# Patient Record
Sex: MALE | Race: WHITE | Employment: OTHER | URBAN - METROPOLITAN AREA
[De-identification: names, ages, dates, MRNs, and addresses within clinical notes are randomized per-mention and may not be internally consistent; named-entity substitution may affect disease eponyms.]

---

## 2017-01-17 PROCEDURE — 85025 COMPLETE CBC W/AUTO DIFF WBC: CPT | Performed by: FAMILY MEDICINE

## 2017-01-17 PROCEDURE — 84520 ASSAY OF UREA NITROGEN: CPT | Performed by: INTERNAL MEDICINE

## 2017-01-17 PROCEDURE — 82565 ASSAY OF CREATININE: CPT | Performed by: INTERNAL MEDICINE

## 2017-01-17 PROCEDURE — 36415 COLL VENOUS BLD VENIPUNCTURE: CPT | Performed by: FAMILY MEDICINE

## 2017-07-19 PROBLEM — E78.5 HYPERLIPIDEMIA, UNSPECIFIED HYPERLIPIDEMIA TYPE: Status: ACTIVE | Noted: 2017-07-19

## 2017-07-19 PROBLEM — R73.01 IMPAIRED FASTING GLUCOSE: Status: ACTIVE | Noted: 2017-07-19

## 2017-07-19 PROBLEM — R97.20 ELEVATED PSA: Status: ACTIVE | Noted: 2017-07-19

## 2018-02-23 PROBLEM — I60.9 SAH (SUBARACHNOID HEMORRHAGE) (HCC): Status: ACTIVE | Noted: 2018-02-23

## 2018-02-23 RX ORDER — SODIUM CHLORIDE, SODIUM LACTATE, POTASSIUM CHLORIDE, CALCIUM CHLORIDE 600; 310; 30; 20 MG/100ML; MG/100ML; MG/100ML; MG/100ML
INJECTION, SOLUTION INTRAVENOUS CONTINUOUS
Status: CANCELLED | OUTPATIENT
Start: 2018-02-23

## 2018-03-26 ENCOUNTER — ANESTHESIA (OUTPATIENT)
Dept: ENDOSCOPY | Facility: HOSPITAL | Age: 73
End: 2018-03-26
Payer: MEDICARE

## 2018-03-26 ENCOUNTER — HOSPITAL ENCOUNTER (OUTPATIENT)
Facility: HOSPITAL | Age: 73
Setting detail: HOSPITAL OUTPATIENT SURGERY
Discharge: HOME | End: 2018-03-26
Attending: INTERNAL MEDICINE | Admitting: INTERNAL MEDICINE
Payer: MEDICARE

## 2018-03-26 ENCOUNTER — SURGERY (OUTPATIENT)
Age: 73
End: 2018-03-26

## 2018-03-26 ENCOUNTER — ANESTHESIA EVENT (OUTPATIENT)
Dept: ENDOSCOPY | Facility: HOSPITAL | Age: 73
End: 2018-03-26
Payer: MEDICARE

## 2018-03-26 VITALS
OXYGEN SATURATION: 96 % | BODY MASS INDEX: 46.65 KG/M2 | RESPIRATION RATE: 18 BRPM | SYSTOLIC BLOOD PRESSURE: 139 MMHG | HEIGHT: 69 IN | WEIGHT: 315 LBS | TEMPERATURE: 99 F | DIASTOLIC BLOOD PRESSURE: 75 MMHG | HEART RATE: 55 BPM

## 2018-03-26 DIAGNOSIS — K22.70 BARRETT'S ESOPHAGUS WITHOUT DYSPLASIA: ICD-10-CM

## 2018-03-26 DIAGNOSIS — Z86.010 HISTORY OF COLON POLYPS: ICD-10-CM

## 2018-03-26 PROCEDURE — 0DB38ZX EXCISION OF LOWER ESOPHAGUS, VIA NATURAL OR ARTIFICIAL OPENING ENDOSCOPIC, DIAGNOSTIC: ICD-10-PCS | Performed by: INTERNAL MEDICINE

## 2018-03-26 PROCEDURE — 0DBK8ZX EXCISION OF ASCENDING COLON, VIA NATURAL OR ARTIFICIAL OPENING ENDOSCOPIC, DIAGNOSTIC: ICD-10-PCS | Performed by: INTERNAL MEDICINE

## 2018-03-26 PROCEDURE — 88321 CONSLTJ&REPRT SLD PREP ELSWR: CPT | Performed by: INTERNAL MEDICINE

## 2018-03-26 PROCEDURE — 88305 TISSUE EXAM BY PATHOLOGIST: CPT | Performed by: INTERNAL MEDICINE

## 2018-03-26 PROCEDURE — 0DBH8ZX EXCISION OF CECUM, VIA NATURAL OR ARTIFICIAL OPENING ENDOSCOPIC, DIAGNOSTIC: ICD-10-PCS | Performed by: INTERNAL MEDICINE

## 2018-03-26 RX ORDER — SODIUM CHLORIDE, SODIUM LACTATE, POTASSIUM CHLORIDE, CALCIUM CHLORIDE 600; 310; 30; 20 MG/100ML; MG/100ML; MG/100ML; MG/100ML
INJECTION, SOLUTION INTRAVENOUS CONTINUOUS
Status: DISCONTINUED | OUTPATIENT
Start: 2018-03-26 | End: 2018-03-26

## 2018-03-26 NOTE — OPERATIVE REPORT
PATIENT NAME: Yulisa Chavez  MRN: [id number]  DATE OF OPERATION: 3/26/2018  PREOPERATIVE DIAGNOSIS:   1. Personal history of Lujan's esophagus without dysplasia  2. Personal history of colon polyps  POSTOPERATIVE DIAGNOSES:  1.  Irregular squamocolumnar junct Biopsies were taken. There were flat and biopsies were taken from that area. There was a small sliding hiatal hernia seen. 2. Normal stomach throughout with no evidence of ulcers, masses or other abnormalities.  Retroflexion revealed a normal cardia an showed no dysplasia then repeat upper endoscopy in 2-3 years.     MD [facility]

## 2018-03-26 NOTE — H&P
[facility] Department of  Gastroenterology  Update Health History :       Rehana Ruiz  male   Dank Ruelas MD     [id number]  5/5/1945 Primary Care Physician  Divine Alston MD        HPI :  History of Lujan's esophagus, and serrated colon po with deformed duodenal bulb, narrowed               duodenum obstructed by food.   Gastric bx + for                H. pylori, duodenal bx negative  1/16/13: UPPER GI ENDOSCOPY,BIOPSY      Comment: deformed duodenum with a choledocho-duodenal (TRILYTE) 420 g Oral Recon Soln Take as directed, split dose Disp: 1 Bottle Rfl: 0   Coenzyme Q10 (COQ10 OR) Take by mouth. Disp:  Rfl:        Review of Symptoms:  A comprehensive 10 point review of systems was completed.     /70 (BP Location: Left ar

## 2018-03-26 NOTE — ANESTHESIA POSTPROCEDURE EVALUATION
[address] Patient Status:  Hospital Outpatient Surgery   Age/Gender 67year old male MRN [id number]   Location [facility]. Attending Ra Black MD   Hosp Day # 0 PCP Elis Cortes MD       Anesthesia Post-op Note

## 2018-03-26 NOTE — ANESTHESIA PREPROCEDURE EVALUATION
PRE-OP EVALUATION    Patient Name: Tatiana Winters    Pre-op Diagnosis: Lujan's esophagus without dysplasia [K22.70]  History of colon polyps [Z86.010]    Procedure(s):  COLONOSCOPY/ESOPHAGOGASTRODUODENOSCOPY       Surgeon(s) and Role:     Sigrid Galindo, ascending colon lipoma, 5 mm splenic flexure                serrated adenoma polyp, diverticulosis  2/27/13: ENDOSCOPIC ULTRASOUND EXAM      Comment: Dr. Adonay Joyner, EGD/EUS, iiregular GE junction                (bx showed intestinal metaplasia), hiatal II  Mouth opening: <3 FB  TM distance: < 4 cm  Neck ROM: limited Cardiovascular    Cardiovascular exam normal.  Rhythm: regular  Rate: normal     Dental    No notable dental history.          Pulmonary    Pulmonary exam normal.  Breath sounds clear to auscu

## 2018-03-29 NOTE — PROGRESS NOTES
3/29/2018  [address] [facility] [address]    Dear Jay Mcgee,       Here are the biopsy/pathology findings from your recent EGD (upper endoscopy) and colonoscopy: The biopsy/pathology findings from your upper endoscopy showed:  1.   Esophagus b

## 2018-04-15 ENCOUNTER — ANESTHESIA EVENT (OUTPATIENT)
Dept: ENDOSCOPY | Facility: HOSPITAL | Age: 73
End: 2018-04-15
Payer: MEDICARE

## 2018-04-15 NOTE — ANESTHESIA PREPROCEDURE EVALUATION
PRE-OP EVALUATION    Patient Name: Emily Rogel    Pre-op Diagnosis: Pancreas cyst [K86.2]    Procedure(s):  ENDOSCOPIC ULTRASOUND WITH FINE NEEDLE ASPIRATION    Surgeon(s) and Role:     Faraz Myrick MD - Primary    Pre-op vitals reviewed.         Body Surgical History:  3/26/2018: COLONOSCOPY N/A      Comment: Procedure: COLONOSCOPY;  Surgeon: Mark Avitia MD;  Location: [facility] ENDOSCOPY  1/16/13: COLONOSCOPY,DIAGNOSTIC      Comment: ascending colon lipoma, 5 mm splenic flexure Date    01/20/2018   K 4.7 01/20/2018    01/20/2018   CO2 24.9 01/20/2018   BUN 27 (H) 03/10/2018   CREATSERUM 1.41 (H) 03/10/2018    (H) 01/20/2018            Airway      Mallampati: II  Mouth opening: >3 FB  TM distance: 4 - 6 cm  Neck

## 2018-04-16 ENCOUNTER — LAB ENCOUNTER (OUTPATIENT)
Dept: LAB | Age: 73
End: 2018-04-16
Attending: INTERNAL MEDICINE
Payer: MEDICARE

## 2018-04-16 ENCOUNTER — ANESTHESIA (OUTPATIENT)
Dept: ENDOSCOPY | Facility: HOSPITAL | Age: 73
End: 2018-04-16
Payer: MEDICARE

## 2018-04-16 ENCOUNTER — SURGERY (OUTPATIENT)
Age: 73
End: 2018-04-16

## 2018-04-16 ENCOUNTER — HOSPITAL ENCOUNTER (OUTPATIENT)
Facility: HOSPITAL | Age: 73
Setting detail: HOSPITAL OUTPATIENT SURGERY
Discharge: HOME | End: 2018-04-16
Attending: INTERNAL MEDICINE | Admitting: INTERNAL MEDICINE
Payer: MEDICARE

## 2018-04-16 VITALS
TEMPERATURE: 98 F | DIASTOLIC BLOOD PRESSURE: 74 MMHG | HEART RATE: 60 BPM | OXYGEN SATURATION: 99 % | BODY MASS INDEX: 45.91 KG/M2 | RESPIRATION RATE: 18 BRPM | WEIGHT: 310 LBS | SYSTOLIC BLOOD PRESSURE: 139 MMHG | HEIGHT: 69 IN

## 2018-04-16 DIAGNOSIS — K86.2 CYST OF PANCREAS: Primary | ICD-10-CM

## 2018-04-16 DIAGNOSIS — K86.2 PANCREAS CYST: ICD-10-CM

## 2018-04-16 PROCEDURE — 88108 CYTOPATH CONCENTRATE TECH: CPT | Performed by: INTERNAL MEDICINE

## 2018-04-16 PROCEDURE — 82150 ASSAY OF AMYLASE: CPT

## 2018-04-16 PROCEDURE — 82378 CARCINOEMBRYONIC ANTIGEN: CPT

## 2018-04-16 PROCEDURE — 0F9G8ZX DRAINAGE OF PANCREAS, VIA NATURAL OR ARTIFICIAL OPENING ENDOSCOPIC, DIAGNOSTIC: ICD-10-PCS | Performed by: INTERNAL MEDICINE

## 2018-04-16 RX ORDER — ONDANSETRON 2 MG/ML
4 INJECTION INTRAMUSCULAR; INTRAVENOUS AS NEEDED
Status: DISCONTINUED | OUTPATIENT
Start: 2018-04-16 | End: 2018-04-16

## 2018-04-16 RX ORDER — SODIUM CHLORIDE, SODIUM LACTATE, POTASSIUM CHLORIDE, CALCIUM CHLORIDE 600; 310; 30; 20 MG/100ML; MG/100ML; MG/100ML; MG/100ML
INJECTION, SOLUTION INTRAVENOUS CONTINUOUS
Status: DISCONTINUED | OUTPATIENT
Start: 2018-04-16 | End: 2018-04-16

## 2018-04-16 RX ORDER — CEFOXITIN 2 G/1
INJECTION, POWDER, FOR SOLUTION INTRAVENOUS
Status: DISCONTINUED | OUTPATIENT
Start: 2018-04-16 | End: 2018-04-16

## 2018-04-16 RX ORDER — NALOXONE HYDROCHLORIDE 0.4 MG/ML
80 INJECTION, SOLUTION INTRAMUSCULAR; INTRAVENOUS; SUBCUTANEOUS AS NEEDED
Status: DISCONTINUED | OUTPATIENT
Start: 2018-04-16 | End: 2018-04-16

## 2018-04-16 NOTE — OPERATIVE REPORT
OPERATIVE REPORT   PATIENT NAME: Holly Gomez  MRN: [id number]  DATE OF OPERATION: 4/16/2018  PREOPERATIVE DIAGNOSIS:   1. Enlarging pancreatic tail cyst  POSTOPERATIVE DIAGNOSES:  1.  Pancreatic tail cyst possible pseudocyst or serocystadenoma  PROCEDURE PERF cystic lesion possible pseudocyst versus serous cystadenoma. RECOMMENDATIONS:  1. Await final cytology, fluid analysis results. Depending on the results to determine subsequent follow-up.   Most likely require follow-up imaging study in 6 month if cytolog

## 2018-04-16 NOTE — ANESTHESIA POSTPROCEDURE EVALUATION
[address] Patient Status:  Hospital Outpatient Surgery   Age/Gender 67year old male MRN [id number]   Location [facility]. Attending Justice Craig MD   Hosp Day # 0 PCP Chely Patton MD       Anesthesia Post-op Note

## 2018-04-18 NOTE — PROGRESS NOTES
Cytology is negative for malignant cells. We will be waiting for the pancreatic cyst fluid analysis.

## 2018-04-25 NOTE — H&P
[facility] Department of  Gastroenterology  Update Health History :       Sobeida Das  male   Gilberto Skiff, MD     [id number]  5/5/1945 Primary Care Physician  Annabel Nice MD        HPI :  Lujan's esophagus, personal history of colon polyps HISTORY      Comment: ear surgery  10/31/12: UPPER GI ENDOSCOPY,BIOPSY      Comment: Ulcerative esophagitis, 1900 cc of fluid in                stomach, antral gastric nodule (bx benign),                gastritis with deformed duodenal bulb, narrowed TAKE 1 TABLET(10 MG) BY MOUTH EVERY NIGHT Disp: 90 tablet Rfl: 3   Pantoprazole Sodium 40 MG Oral Tab EC TAKE 1 TABLET(40 MG) BY MOUTH EVERY MORNING BEFORE BREAKFAST Disp: 90 tablet Rfl: 3   acetaminophen (TYLENOL) 325 MG Oral Tab Take 650 mg by mouth shari

## 2018-05-02 NOTE — PROGRESS NOTES
CEA 0.4, Amylase 75 suggestive of serous cystadenoma. 5/1/2018  [address] [facility] [address]    Dear Jacek Holland,       Here are the biopsy/pathology findings from your recent endoscopic ultrasound  1.   Pancreatic cyst evaluation and fluid

## 2018-05-08 NOTE — PROGRESS NOTES
Letter sent and pt viewed via my chart  /1/2018   Kellen Alex   [phone] [address]     Dear Fer Zee,       Here are the biopsy/pathology findings from your recent endoscopic ultrasound   1.  Pancreatic cyst evaluation and fluid analysis is mos

## 2018-07-18 PROBLEM — D69.2 SENILE PURPURA (HCC): Status: ACTIVE | Noted: 2018-07-18

## 2018-09-15 PROCEDURE — 84153 ASSAY OF PSA TOTAL: CPT | Performed by: UROLOGY

## 2018-09-15 PROCEDURE — 84154 ASSAY OF PSA FREE: CPT | Performed by: UROLOGY

## 2019-01-22 PROBLEM — Z00.00 ANNUAL PHYSICAL EXAM: Status: ACTIVE | Noted: 2019-01-22

## 2019-01-22 PROBLEM — S06.6X0D SUBARACHNOID HEMORRHAGE FOLLOWING INJURY, NO LOSS OF CONSCIOUSNESS, SUBSEQUENT ENCOUNTER: Status: RESOLVED | Noted: 2019-01-22 | Resolved: 2019-01-22

## 2019-01-22 PROBLEM — S06.6X0D SUBARACHNOID HEMORRHAGE FOLLOWING INJURY, NO LOSS OF CONSCIOUSNESS, SUBSEQUENT ENCOUNTER: Status: ACTIVE | Noted: 2019-01-22

## 2019-01-22 PROBLEM — Z00.00 ANNUAL PHYSICAL EXAM: Status: RESOLVED | Noted: 2019-01-22 | Resolved: 2019-01-22

## 2019-01-23 PROBLEM — D69.2 SENILE PURPURA (HCC): Status: RESOLVED | Noted: 2018-07-18 | Resolved: 2019-01-23

## 2019-01-23 PROBLEM — I77.9 CAROTID ARTERY DISEASE, UNSPECIFIED LATERALITY, UNSPECIFIED TYPE (HCC): Status: ACTIVE | Noted: 2019-01-23

## 2019-01-23 PROBLEM — Z86.79 HISTORY OF SUBARACHNOID HEMORRHAGE: Status: ACTIVE | Noted: 2019-01-23

## 2019-02-18 PROBLEM — I77.9 CAROTID ARTERY DISEASE, UNSPECIFIED LATERALITY, UNSPECIFIED TYPE (HCC): Status: RESOLVED | Noted: 2019-01-23 | Resolved: 2019-02-18

## 2019-03-30 ENCOUNTER — ANESTHESIA EVENT (OUTPATIENT)
Dept: ENDOSCOPY | Facility: HOSPITAL | Age: 74
End: 2019-03-30

## 2019-03-31 NOTE — ANESTHESIA PREPROCEDURE EVALUATION
PRE-OP EVALUATION    Patient Name: Marcel Loving    Pre-op Diagnosis: Pancreas cyst [K86.2]    Procedure(s):  ENDOSCOPIC ULTRASOUND  WITH FINE NEEDLE ASPIRATION     Surgeon(s) and Role:     rDew King MD - Primary    Pre-op vitals reviewed.         Kenny Merino COLONOSCOPY,DIAGNOSTIC  1/16/13    ascending colon lipoma, 5 mm splenic flexure serrated adenoma polyp, diverticulosis   • ENDOSCOPIC ULTRASOUND (EUS) N/A 4/16/2018    Performed by Sancho De Los Santos MD at [facility]  2/27/13 Dental      Dental appliance(s): upper dentures       Pulmonary      Breath sounds clear to auscultation bilaterally. Other findings            ASA: 3   Plan: MAC  NPO status verified and patient meets guidelines.           Plan/risks discus

## 2019-04-01 ENCOUNTER — ANESTHESIA (OUTPATIENT)
Dept: ENDOSCOPY | Facility: HOSPITAL | Age: 74
End: 2019-04-01

## 2019-04-01 ENCOUNTER — LAB ENCOUNTER (OUTPATIENT)
Dept: LAB | Age: 74
End: 2019-04-01
Attending: INTERNAL MEDICINE
Payer: MEDICARE

## 2019-04-01 ENCOUNTER — HOSPITAL ENCOUNTER (OUTPATIENT)
Facility: HOSPITAL | Age: 74
Setting detail: HOSPITAL OUTPATIENT SURGERY
Discharge: HOME | End: 2019-04-01
Attending: INTERNAL MEDICINE | Admitting: INTERNAL MEDICINE
Payer: MEDICARE

## 2019-04-01 VITALS
BODY MASS INDEX: 47.19 KG/M2 | TEMPERATURE: 98 F | OXYGEN SATURATION: 97 % | DIASTOLIC BLOOD PRESSURE: 65 MMHG | SYSTOLIC BLOOD PRESSURE: 120 MMHG | RESPIRATION RATE: 20 BRPM | WEIGHT: 315 LBS | HEART RATE: 57 BPM | HEIGHT: 68.5 IN

## 2019-04-01 DIAGNOSIS — Z00.00 ROUTINE GENERAL MEDICAL EXAMINATION AT A HEALTH CARE FACILITY: Primary | ICD-10-CM

## 2019-04-01 DIAGNOSIS — K86.2 PANCREAS CYST: ICD-10-CM

## 2019-04-01 PROCEDURE — 0F9G8ZX DRAINAGE OF PANCREAS, VIA NATURAL OR ARTIFICIAL OPENING ENDOSCOPIC, DIAGNOSTIC: ICD-10-PCS | Performed by: INTERNAL MEDICINE

## 2019-04-01 PROCEDURE — 82150 ASSAY OF AMYLASE: CPT

## 2019-04-01 PROCEDURE — 82378 CARCINOEMBRYONIC ANTIGEN: CPT

## 2019-04-01 RX ORDER — SODIUM CHLORIDE, SODIUM LACTATE, POTASSIUM CHLORIDE, CALCIUM CHLORIDE 600; 310; 30; 20 MG/100ML; MG/100ML; MG/100ML; MG/100ML
INJECTION, SOLUTION INTRAVENOUS CONTINUOUS
Status: DISCONTINUED | OUTPATIENT
Start: 2019-04-01 | End: 2019-04-01

## 2019-04-01 RX ORDER — ONDANSETRON 2 MG/ML
4 INJECTION INTRAMUSCULAR; INTRAVENOUS AS NEEDED
Status: DISCONTINUED | OUTPATIENT
Start: 2019-04-01 | End: 2019-04-01

## 2019-04-01 RX ORDER — METOCLOPRAMIDE HYDROCHLORIDE 5 MG/ML
10 INJECTION INTRAMUSCULAR; INTRAVENOUS AS NEEDED
Status: DISCONTINUED | OUTPATIENT
Start: 2019-04-01 | End: 2019-04-01

## 2019-04-01 RX ORDER — NALOXONE HYDROCHLORIDE 0.4 MG/ML
80 INJECTION, SOLUTION INTRAMUSCULAR; INTRAVENOUS; SUBCUTANEOUS AS NEEDED
Status: DISCONTINUED | OUTPATIENT
Start: 2019-04-01 | End: 2019-04-01

## 2019-04-01 RX ORDER — DIPHENHYDRAMINE HYDROCHLORIDE 50 MG/ML
12.5 INJECTION INTRAMUSCULAR; INTRAVENOUS AS NEEDED
Status: DISCONTINUED | OUTPATIENT
Start: 2019-04-01 | End: 2019-04-01

## 2019-04-01 RX ORDER — HYDROMORPHONE HYDROCHLORIDE 1 MG/ML
0.4 INJECTION, SOLUTION INTRAMUSCULAR; INTRAVENOUS; SUBCUTANEOUS EVERY 5 MIN PRN
Status: DISCONTINUED | OUTPATIENT
Start: 2019-04-01 | End: 2019-04-01

## 2019-04-01 NOTE — OPERATIVE REPORT
OPERATIVE REPORT   PATIENT NAME: Dalton Washington  MRN: [id number]  DATE OF OPERATION: 4/1/2019  PREOPERATIVE DIAGNOSIS:   1. Enlarging pancreatic tail cyst  POSTOPERATIVE DIAGNOSES:  1.  Pancreatic tail cyst with thin septation without obvious alarming features was sent for CEA, amylase, and cytology. The main pancreatic duct that appeared to be dilated. The head of the pancreas and the uncinate process were then examined from the second portion duodenum as well as duodenal bulb.   The common bile duct that appe

## 2019-04-10 NOTE — PROGRESS NOTES
Pancreas fluid analysis most consistent with pseudocyst.  We will waiting for cytology. Most likely will perform CT scan in 3 months unless cytology showed neoplastic cells.

## 2019-04-23 NOTE — PROGRESS NOTES
Request for cytology to be sent to [facility] clinic for 2nd opinion. Awaiting results.

## 2019-05-02 ENCOUNTER — APPOINTMENT (OUTPATIENT)
Dept: LAB | Facility: HOSPITAL | Age: 74
End: 2019-05-02
Attending: PATHOLOGY
Payer: MEDICARE

## 2019-05-07 NOTE — PROGRESS NOTES
RA discussed case with pathology ?low grade dysplasia on review. Recommend repeat EGD with biopsies. Awaiting cytology on panc cyst.  Will plan for repeat CT in three months as previously planned.

## 2019-05-07 NOTE — PROGRESS NOTES
Lujan's without dysplasia  Colon polyps: TA with low grade dysplasia. Will continue with recall plan as noted previously. EGD in 2-3 years. Colonoscopy in three years. We had requested cytology from recent pancreatic cyst FNA. Awaiting results.

## 2019-05-23 NOTE — PROGRESS NOTES
No mucin on final cytology. Needs EGD with biopsies for history of Lujan's with ?low grade dysplasia.

## 2019-07-08 ENCOUNTER — HOSPITAL ENCOUNTER (OUTPATIENT)
Facility: HOSPITAL | Age: 74
Setting detail: HOSPITAL OUTPATIENT SURGERY
Discharge: HOME | End: 2019-07-08
Attending: INTERNAL MEDICINE | Admitting: INTERNAL MEDICINE
Payer: MEDICARE

## 2019-07-08 ENCOUNTER — ANESTHESIA (OUTPATIENT)
Dept: ENDOSCOPY | Facility: HOSPITAL | Age: 74
End: 2019-07-08

## 2019-07-08 ENCOUNTER — ANESTHESIA EVENT (OUTPATIENT)
Dept: ENDOSCOPY | Facility: HOSPITAL | Age: 74
End: 2019-07-08

## 2019-07-08 VITALS
HEIGHT: 68.5 IN | WEIGHT: 315 LBS | RESPIRATION RATE: 18 BRPM | DIASTOLIC BLOOD PRESSURE: 61 MMHG | OXYGEN SATURATION: 95 % | BODY MASS INDEX: 47.19 KG/M2 | HEART RATE: 62 BPM | TEMPERATURE: 98 F | SYSTOLIC BLOOD PRESSURE: 107 MMHG

## 2019-07-08 DIAGNOSIS — Z87.19 HISTORY OF BARRETT'S ESOPHAGUS: ICD-10-CM

## 2019-07-08 PROCEDURE — 88305 TISSUE EXAM BY PATHOLOGIST: CPT | Performed by: INTERNAL MEDICINE

## 2019-07-08 PROCEDURE — 0DB48ZX EXCISION OF ESOPHAGOGASTRIC JUNCTION, VIA NATURAL OR ARTIFICIAL OPENING ENDOSCOPIC, DIAGNOSTIC: ICD-10-PCS | Performed by: INTERNAL MEDICINE

## 2019-07-08 RX ORDER — SODIUM CHLORIDE, SODIUM LACTATE, POTASSIUM CHLORIDE, CALCIUM CHLORIDE 600; 310; 30; 20 MG/100ML; MG/100ML; MG/100ML; MG/100ML
INJECTION, SOLUTION INTRAVENOUS CONTINUOUS
Status: DISCONTINUED | OUTPATIENT
Start: 2019-07-08 | End: 2019-07-08

## 2019-07-08 NOTE — ANESTHESIA POSTPROCEDURE EVALUATION
[address] Patient Status:  Hospital Outpatient Surgery   Age/Gender 75year old male MRN [id number]   Location [facility]. Attending David Moran MD   Hosp Day # 0 PCP Missy Richard MD       Anesthesia Post-op Note

## 2019-07-08 NOTE — OPERATIVE REPORT
OPERATIVE REPORT   PATIENT NAME: Nelsy Austin  MRN: [id number]  DATE OF OPERATION: 7/8/2019  PREOPERATIVE DIAGNOSIS:   1.  Questionable low-grade dysplasia on biopsy from the GE junction (in retrospect exam of the pathology slides by Kristen Alamo pathologist)  Taye Dominguez to prior ulceration but patent. No active ulcer seen. The rest of the stomach examination was overall unremarkable. The gastric folds were not thickened. 3. Normal duodenum to the second portion with no ulcers or masses seen. IMPRESSION:  1.  As abov

## 2019-07-08 NOTE — H&P
[facility] Department of  Gastroenterology  Update Health History :       Isidro Phalen  male   Farnaz Valadez MD     [id number]  5/5/1945 Primary Care Physician  Montez Barraza MD        HPI :  ??  LGD from bx at GE junction, here for follow up ESOPHAGOGASTRODUODENOSCOPY (EGD) N/A 3/26/2018    Performed by Lea Hannah MD at [facility] ENDOSCOPY   • OTHER SURGICAL HISTORY      ear surgery   • TONSILLECTOMY     • UPPER GI ENDOSCOPY,BIOPSY  10/31/12    Ulcerative esophagitis, 1900 cc of fluid in stomac 10 MG Oral Tab Take 1 tablet (10 mg total) by mouth once daily. Disp: 90 tablet Rfl: 1   ATORVASTATIN 10 MG Oral Tab TAKE 1 TABLET(10 MG) BY MOUTH EVERY NIGHT Disp: 90 tablet Rfl: 1   Coenzyme Q10 (COQ10 OR) Take by mouth daily.    Disp:  Rfl:    multiple v

## 2019-07-08 NOTE — ANESTHESIA PREPROCEDURE EVALUATION
PRE-OP EVALUATION    Patient Name: Salvador Benjamin    Pre-op Diagnosis: History of Lujan's esophagus [Z87.19]    Procedure(s):  ESOPHAGOGASTRODUODENOSCOPY    Surgeon(s) and Role:     Sonali Sanchez MD - Primary    Pre-op vitals reviewed.   Temp: 97.7 °F ( hypertension Disorder of ear ossicles   PUD (peptic ulcer disease) BPH (benign prostatic hyperplasia)   Morbid obesity (HCC) Chronic renal insufficiency, stage 3 (moderate) (HCC)   DJD (degenerative joint disease), ankle and foot, right Lujan's esophagus • ESOPHAGOGASTRODUODENOSCOPY (EGD) N/A 3/26/2018    Performed by Selina Dean MD at [facility] ENDOSCOPY   • OTHER SURGICAL HISTORY      ear surgery   • TONSILLECTOMY     • UPPER GI ENDOSCOPY,BIOPSY  10/31/12    Ulcerative esophagitis, 1900 cc of fluid in st Admission:  **None**

## 2019-07-12 NOTE — PROGRESS NOTES
No dysplasia. Will confirm timing of surveillance EGD with RA. Previous recommendations was 2-3 years.

## 2019-07-12 NOTE — PROGRESS NOTES
7/12/2019  [address] [place] [address] [address] [address]    Dear Arleen Clark,       Here are the biopsy/pathology findings from your recent EGD (Upper  Endoscopy):  1. Esophagus biopsies with Lujan's esophagus. NO dysplasia.        Follow-up information:

## 2020-01-03 NOTE — ANESTHESIA POSTPROCEDURE EVALUATION
[address] Patient Status:  Hospital Outpatient Surgery   Age/Gender 75year old male MRN [id number]   Location [address] [facility]. Attending Neo Brenner MD   Hosp Day # 0 PCP Gardenia Mills MD       Anesthesia Post-op Note Pt straight cathed for urine specimen

## 2020-06-04 PROBLEM — E66.01 OBESITY, MORBID (HCC): Status: ACTIVE | Noted: 2020-06-04

## 2021-01-15 PROBLEM — N18.31 STAGE 3A CHRONIC KIDNEY DISEASE (HCC): Status: ACTIVE | Noted: 2021-01-15

## 2021-04-27 PROCEDURE — 88305 TISSUE EXAM BY PATHOLOGIST: CPT | Performed by: INTERNAL MEDICINE

## 2022-01-20 PROBLEM — J44.1 CHRONIC OBSTRUCTIVE PULMONARY DISEASE WITH (ACUTE) EXACERBATION (HCC): Status: ACTIVE | Noted: 2022-01-20

## (undated) DEVICE — 1200CC GUARDIAN II: Brand: GUARDIAN

## (undated) DEVICE — ENDOSCOPY PACK UPPER: Brand: MEDLINE INDUSTRIES, INC.

## (undated) DEVICE — 3M™ RED DOT™ MONITORING ELECTRODE WITH FOAM TAPE AND STICKY GEL, 50/BAG, 20/CASE, 72/PLT 2570: Brand: RED DOT™

## (undated) DEVICE — ENDOSCOPY PACK - LOWER: Brand: MEDLINE INDUSTRIES, INC.

## (undated) DEVICE — FILTERLINE NASAL ADULT O2/CO2

## (undated) DEVICE — THE REVEAL DISTAL ATTACHMENT CAP IS ATTACHED TO THE DISTAL END OF THE ENDOSCOPE TO FACILITATE ENDOSCOPIC THERAPY AND IS INTENDED FOR GASTROINTESTINAL MUCOSAL RESECTION (ENDOSCOPIC MUCOSAL RESECTION) AND KEEPING THE SUITABLE DEPTH OF ENDOSCOPE'S VIEW FIELD.: Brand: REVEAL

## (undated) DEVICE — Device

## (undated) DEVICE — FORCEP RADIAL JAW 4

## (undated) DEVICE — DISPOSABLE DISTAL ATTACHMENT: Brand: DISPOSABLE DISTAL ATTACHMENT

## (undated) DEVICE — Device: Brand: DEFENDO AIR/WATER/SUCTION AND BIOPSY VALVE

## (undated) NOTE — LETTER
3/29/2018          [address] [facility] [address]    Dear Verito Houser,     Here are the biopsy/pathology findings from your recent EGD (upper endoscopy) and colonoscopy: The biopsy/pathology findings from your upper endoscopy showed:  1.   E

## (undated) NOTE — LETTER
7/12/2019          [address] [address]    Dear Dominic Ayala,       Here are the biopsy/pathology findings from your recent EGD (Upper  Endoscopy):  1. Esophagus biopsies with Lujan's esophagus. NO dysplasia.        Follow-up i

## (undated) NOTE — LETTER
5/1/2018          [address] [facility] [address]    Dear Elizabeth Waters,       Here are the biopsy/pathology findings from your recent endoscopic ultrasound  1.   Pancreatic cyst evaluation and fluid analysis is most consistent with a benign cyst